# Patient Record
Sex: MALE | Race: BLACK OR AFRICAN AMERICAN | NOT HISPANIC OR LATINO | Employment: FULL TIME | URBAN - METROPOLITAN AREA
[De-identification: names, ages, dates, MRNs, and addresses within clinical notes are randomized per-mention and may not be internally consistent; named-entity substitution may affect disease eponyms.]

---

## 2023-02-24 LAB
ALANINE AMINOTRANSFERASE (SGPT) (U/L) IN SER/PLAS: 33 U/L (ref 10–52)
ALBUMIN (G/DL) IN SER/PLAS: 4.6 G/DL (ref 3.4–5)
ALKALINE PHOSPHATASE (U/L) IN SER/PLAS: 64 U/L (ref 33–120)
ANION GAP IN SER/PLAS: 12 MMOL/L (ref 10–20)
ASPARTATE AMINOTRANSFERASE (SGOT) (U/L) IN SER/PLAS: 25 U/L (ref 9–39)
BASOPHILS (10*3/UL) IN BLOOD BY AUTOMATED COUNT: 0.02 X10E9/L (ref 0–0.1)
BASOPHILS/100 LEUKOCYTES IN BLOOD BY AUTOMATED COUNT: 0.3 % (ref 0–2)
BILIRUBIN TOTAL (MG/DL) IN SER/PLAS: 0.8 MG/DL (ref 0–1.2)
CALCIUM (MG/DL) IN SER/PLAS: 9.5 MG/DL (ref 8.6–10.6)
CARBON DIOXIDE, TOTAL (MMOL/L) IN SER/PLAS: 26 MMOL/L (ref 21–32)
CHLORIDE (MMOL/L) IN SER/PLAS: 105 MMOL/L (ref 98–107)
CHOLESTEROL (MG/DL) IN SER/PLAS: 165 MG/DL (ref 0–199)
CHOLESTEROL IN HDL (MG/DL) IN SER/PLAS: 43.7 MG/DL
CHOLESTEROL/HDL RATIO: 3.8
CREATININE (MG/DL) IN SER/PLAS: 0.89 MG/DL (ref 0.5–1.3)
EOSINOPHILS (10*3/UL) IN BLOOD BY AUTOMATED COUNT: 0.24 X10E9/L (ref 0–0.7)
EOSINOPHILS/100 LEUKOCYTES IN BLOOD BY AUTOMATED COUNT: 3.5 % (ref 0–6)
ERYTHROCYTE DISTRIBUTION WIDTH (RATIO) BY AUTOMATED COUNT: 12.1 % (ref 11.5–14.5)
ERYTHROCYTE MEAN CORPUSCULAR HEMOGLOBIN CONCENTRATION (G/DL) BY AUTOMATED: 32.9 G/DL (ref 32–36)
ERYTHROCYTE MEAN CORPUSCULAR VOLUME (FL) BY AUTOMATED COUNT: 88 FL (ref 80–100)
ERYTHROCYTES (10*6/UL) IN BLOOD BY AUTOMATED COUNT: 4.94 X10E12/L (ref 4.5–5.9)
GFR MALE: >90 ML/MIN/1.73M2
GLUCOSE (MG/DL) IN SER/PLAS: 69 MG/DL (ref 74–99)
HEMATOCRIT (%) IN BLOOD BY AUTOMATED COUNT: 43.4 % (ref 41–52)
HEMOGLOBIN (G/DL) IN BLOOD: 14.3 G/DL (ref 13.5–17.5)
IMMATURE GRANULOCYTES/100 LEUKOCYTES IN BLOOD BY AUTOMATED COUNT: 0.3 % (ref 0–0.9)
LDL: 114 MG/DL (ref 0–99)
LEUKOCYTES (10*3/UL) IN BLOOD BY AUTOMATED COUNT: 6.9 X10E9/L (ref 4.4–11.3)
LYMPHOCYTES (10*3/UL) IN BLOOD BY AUTOMATED COUNT: 1.91 X10E9/L (ref 1.2–4.8)
LYMPHOCYTES/100 LEUKOCYTES IN BLOOD BY AUTOMATED COUNT: 27.8 % (ref 13–44)
MONOCYTES (10*3/UL) IN BLOOD BY AUTOMATED COUNT: 0.61 X10E9/L (ref 0.1–1)
MONOCYTES/100 LEUKOCYTES IN BLOOD BY AUTOMATED COUNT: 8.9 % (ref 2–10)
NEUTROPHILS (10*3/UL) IN BLOOD BY AUTOMATED COUNT: 4.07 X10E9/L (ref 1.2–7.7)
NEUTROPHILS/100 LEUKOCYTES IN BLOOD BY AUTOMATED COUNT: 59.2 % (ref 40–80)
NRBC (PER 100 WBCS) BY AUTOMATED COUNT: 0 /100 WBC (ref 0–0)
PLATELETS (10*3/UL) IN BLOOD AUTOMATED COUNT: 321 X10E9/L (ref 150–450)
POTASSIUM (MMOL/L) IN SER/PLAS: 4.3 MMOL/L (ref 3.5–5.3)
PROTEIN TOTAL: 7.3 G/DL (ref 6.4–8.2)
SODIUM (MMOL/L) IN SER/PLAS: 139 MMOL/L (ref 136–145)
THYROTROPIN (MIU/L) IN SER/PLAS BY DETECTION LIMIT <= 0.05 MIU/L: 0.72 MIU/L (ref 0.44–3.98)
TRIGLYCERIDE (MG/DL) IN SER/PLAS: 39 MG/DL (ref 0–149)
UREA NITROGEN (MG/DL) IN SER/PLAS: 11 MG/DL (ref 6–23)
VLDL: 8 MG/DL (ref 0–40)

## 2024-02-19 ENCOUNTER — OFFICE VISIT (OUTPATIENT)
Dept: PRIMARY CARE | Facility: CLINIC | Age: 44
End: 2024-02-19
Payer: COMMERCIAL

## 2024-02-19 VITALS
WEIGHT: 205 LBS | TEMPERATURE: 98.2 F | OXYGEN SATURATION: 96 % | SYSTOLIC BLOOD PRESSURE: 138 MMHG | BODY MASS INDEX: 28.7 KG/M2 | DIASTOLIC BLOOD PRESSURE: 88 MMHG | HEIGHT: 71 IN | HEART RATE: 87 BPM

## 2024-02-19 DIAGNOSIS — F51.01 PRIMARY INSOMNIA: Primary | ICD-10-CM

## 2024-02-19 PROCEDURE — 1036F TOBACCO NON-USER: CPT | Performed by: STUDENT IN AN ORGANIZED HEALTH CARE EDUCATION/TRAINING PROGRAM

## 2024-02-19 PROCEDURE — 99213 OFFICE O/P EST LOW 20 MIN: CPT | Performed by: STUDENT IN AN ORGANIZED HEALTH CARE EDUCATION/TRAINING PROGRAM

## 2024-02-19 RX ORDER — HYDROXYZINE HYDROCHLORIDE 25 MG/1
25 TABLET, FILM COATED ORAL NIGHTLY PRN
COMMUNITY
Start: 2023-02-20 | End: 2024-02-19 | Stop reason: SDUPTHER

## 2024-02-19 RX ORDER — HYDROXYZINE HYDROCHLORIDE 25 MG/1
25 TABLET, FILM COATED ORAL NIGHTLY PRN
Qty: 15 TABLET | Refills: 0 | Status: SHIPPED | OUTPATIENT
Start: 2024-02-19 | End: 2024-04-19

## 2024-02-19 ASSESSMENT — ENCOUNTER SYMPTOMS
HEADACHES: 0
ABDOMINAL PAIN: 0
SHORTNESS OF BREATH: 0
CHILLS: 0
LIGHT-HEADEDNESS: 0
ARTHRALGIAS: 0
MYALGIAS: 0
FEVER: 0
DIZZINESS: 0

## 2024-02-19 ASSESSMENT — PATIENT HEALTH QUESTIONNAIRE - PHQ9
2. FEELING DOWN, DEPRESSED OR HOPELESS: NOT AT ALL
1. LITTLE INTEREST OR PLEASURE IN DOING THINGS: NOT AT ALL
SUM OF ALL RESPONSES TO PHQ9 QUESTIONS 1 AND 2: 0

## 2024-02-19 NOTE — PROGRESS NOTES
"Subjective   Patient ID: Lance Cordero is a 43 y.o. male who presents for Follow-up.    HPI     No acute concerns or complaints today.  Patient presenting for regular follow-up.  He states that he follows up usually every other year for regular wellness examination.  He still usually comes in once a year to get refills of his prescription primarily for hydroxyzine to help treat his insomnia.  He states that he only needs to use it more sporadically but that it does really help with his symptoms when he does have them.  He has not used any other treatments and states that it seems to work very well.  Patient states he will plan on getting lab work at his next follow-up next year.    Review of Systems   Constitutional:  Negative for chills and fever.   HENT:  Negative for congestion.    Respiratory:  Negative for shortness of breath.    Cardiovascular:  Negative for chest pain.   Gastrointestinal:  Negative for abdominal pain.   Musculoskeletal:  Negative for arthralgias and myalgias.   Neurological:  Negative for dizziness, light-headedness and headaches.       Objective   /88   Pulse 87   Temp 36.8 °C (98.2 °F)   Ht 1.797 m (5' 10.75\")   Wt 93 kg (205 lb)   SpO2 96%   BMI 28.79 kg/m²     Physical Exam  Vitals and nursing note reviewed.   Constitutional:       General: He is not in acute distress.     Appearance: Normal appearance. He is normal weight. He is not ill-appearing or toxic-appearing.   HENT:      Head: Normocephalic and atraumatic.   Cardiovascular:      Rate and Rhythm: Normal rate and regular rhythm.      Heart sounds: Normal heart sounds.   Pulmonary:      Effort: Pulmonary effort is normal.      Breath sounds: Normal breath sounds.   Neurological:      Mental Status: He is alert.         Assessment/Plan   Problem List Items Addressed This Visit    None  Visit Diagnoses         Codes    Primary insomnia    -  Primary F51.01    Relevant Medications    hydrOXYzine HCL (Atarax) 25 mg tablet "          History and physical examination as above.  Patient presenting for follow-up and medication refill for his insomnia.  Refilled medication for hydroxyzine.  Did recommend regular yearly follow-up as well as regular blood pressure testing and lab work.  He states he will follow-up next year with his primary care physician.  Did discuss other over-the-counter and supplement based treatments that may also help with his insomnia.  He states that he probably will give them a try.  Patient will come in sooner for other acute concerns or complaints.

## 2024-07-08 ENCOUNTER — APPOINTMENT (OUTPATIENT)
Dept: PRIMARY CARE | Facility: CLINIC | Age: 44
End: 2024-07-08
Payer: COMMERCIAL

## 2024-07-08 VITALS
HEART RATE: 81 BPM | SYSTOLIC BLOOD PRESSURE: 128 MMHG | DIASTOLIC BLOOD PRESSURE: 92 MMHG | HEIGHT: 71 IN | WEIGHT: 211 LBS | BODY MASS INDEX: 29.54 KG/M2 | OXYGEN SATURATION: 97 % | TEMPERATURE: 97.1 F

## 2024-07-08 DIAGNOSIS — I10 PRIMARY HYPERTENSION: Primary | ICD-10-CM

## 2024-07-08 DIAGNOSIS — Z00.00 ROUTINE HEALTH MAINTENANCE: ICD-10-CM

## 2024-07-08 PROCEDURE — 3080F DIAST BP >= 90 MM HG: CPT | Performed by: FAMILY MEDICINE

## 2024-07-08 PROCEDURE — 99213 OFFICE O/P EST LOW 20 MIN: CPT | Performed by: FAMILY MEDICINE

## 2024-07-08 PROCEDURE — 3074F SYST BP LT 130 MM HG: CPT | Performed by: FAMILY MEDICINE

## 2024-07-08 RX ORDER — AMLODIPINE BESYLATE 5 MG/1
5 TABLET ORAL DAILY
Qty: 30 TABLET | Refills: 0 | Status: SHIPPED | OUTPATIENT
Start: 2024-07-08 | End: 2025-07-08

## 2024-07-08 ASSESSMENT — ENCOUNTER SYMPTOMS
CONSTITUTIONAL NEGATIVE: 1
GASTROINTESTINAL NEGATIVE: 1
DEPRESSION: 0
EYES NEGATIVE: 1
RESPIRATORY NEGATIVE: 1
CARDIOVASCULAR NEGATIVE: 1
LOSS OF SENSATION IN FEET: 0
OCCASIONAL FEELINGS OF UNSTEADINESS: 0

## 2024-07-08 ASSESSMENT — PATIENT HEALTH QUESTIONNAIRE - PHQ9
1. LITTLE INTEREST OR PLEASURE IN DOING THINGS: NOT AT ALL
10. IF YOU CHECKED OFF ANY PROBLEMS, HOW DIFFICULT HAVE THESE PROBLEMS MADE IT FOR YOU TO DO YOUR WORK, TAKE CARE OF THINGS AT HOME, OR GET ALONG WITH OTHER PEOPLE: NOT DIFFICULT AT ALL
SUM OF ALL RESPONSES TO PHQ9 QUESTIONS 1 AND 2: 0
2. FEELING DOWN, DEPRESSED OR HOPELESS: NOT AT ALL

## 2024-07-08 NOTE — PATIENT INSTRUCTIONS
Blood pressure remains elevated.    Adding medication to reduce the blood pressure.    Objective follow-up in 4 weeks please have lab studies performed please watch for troubles with headache or swelling of the legs or feet.  If this should happen please call and let me know.

## 2024-07-18 ENCOUNTER — LAB (OUTPATIENT)
Dept: LAB | Facility: LAB | Age: 44
End: 2024-07-18
Payer: COMMERCIAL

## 2024-07-18 DIAGNOSIS — I10 PRIMARY HYPERTENSION: ICD-10-CM

## 2024-07-18 DIAGNOSIS — Z00.00 ROUTINE HEALTH MAINTENANCE: ICD-10-CM

## 2024-07-18 LAB
BASOPHILS # BLD MANUAL: 0.15 X10*3/UL (ref 0–0.1)
BASOPHILS NFR BLD MANUAL: 2.6 %
EOSINOPHIL # BLD MANUAL: 0.45 X10*3/UL (ref 0–0.7)
EOSINOPHIL NFR BLD MANUAL: 7.7 %
ERYTHROCYTE [DISTWIDTH] IN BLOOD BY AUTOMATED COUNT: 12.4 % (ref 11.5–14.5)
HCT VFR BLD AUTO: 46.6 % (ref 41–52)
HGB BLD-MCNC: 14.9 G/DL (ref 13.5–17.5)
IMM GRANULOCYTES # BLD AUTO: 0.01 X10*3/UL (ref 0–0.7)
IMM GRANULOCYTES NFR BLD AUTO: 0.2 % (ref 0–0.9)
LYMPHOCYTES # BLD MANUAL: 1.93 X10*3/UL (ref 1.2–4.8)
LYMPHOCYTES NFR BLD MANUAL: 33.3 %
MCH RBC QN AUTO: 28.9 PG (ref 26–34)
MCHC RBC AUTO-ENTMCNC: 32 G/DL (ref 32–36)
MCV RBC AUTO: 90 FL (ref 80–100)
MONOCYTES # BLD MANUAL: 0.74 X10*3/UL (ref 0.1–1)
MONOCYTES NFR BLD MANUAL: 12.8 %
NEUTS SEG # BLD MANUAL: 2.38 X10*3/UL (ref 1.2–7)
NEUTS SEG NFR BLD MANUAL: 41 %
NRBC BLD-RTO: 0 /100 WBCS (ref 0–0)
PLASMA CELLS # BLD MANUAL: 0.05 X10*3/UL
PLASMA CELLS NFR BLD MANUAL: 0.9 %
PLATELET # BLD AUTO: 266 X10*3/UL (ref 150–450)
RBC # BLD AUTO: 5.16 X10*6/UL (ref 4.5–5.9)
RBC MORPH BLD: ABNORMAL
TOTAL CELLS COUNTED BLD: 117
VARIANT LYMPHS # BLD MANUAL: 0.1 X10*3/UL (ref 0–0.5)
VARIANT LYMPHS NFR BLD: 1.7 %
WBC # BLD AUTO: 5.8 X10*3/UL (ref 4.4–11.3)

## 2024-07-18 PROCEDURE — 80061 LIPID PANEL: CPT

## 2024-07-18 PROCEDURE — 85027 COMPLETE CBC AUTOMATED: CPT

## 2024-07-18 PROCEDURE — 85007 BL SMEAR W/DIFF WBC COUNT: CPT

## 2024-07-18 PROCEDURE — 84443 ASSAY THYROID STIM HORMONE: CPT

## 2024-07-18 PROCEDURE — 80053 COMPREHEN METABOLIC PANEL: CPT

## 2024-07-18 PROCEDURE — 36415 COLL VENOUS BLD VENIPUNCTURE: CPT

## 2024-07-19 LAB
ALBUMIN SERPL BCP-MCNC: 4.6 G/DL (ref 3.4–5)
ALP SERPL-CCNC: 75 U/L (ref 33–120)
ALT SERPL W P-5'-P-CCNC: 26 U/L (ref 10–52)
ANION GAP SERPL CALC-SCNC: 11 MMOL/L (ref 10–20)
AST SERPL W P-5'-P-CCNC: 23 U/L (ref 9–39)
BILIRUB SERPL-MCNC: 0.4 MG/DL (ref 0–1.2)
BUN SERPL-MCNC: 10 MG/DL (ref 6–23)
CALCIUM SERPL-MCNC: 9.9 MG/DL (ref 8.6–10.6)
CHLORIDE SERPL-SCNC: 104 MMOL/L (ref 98–107)
CHOLEST SERPL-MCNC: 203 MG/DL (ref 0–199)
CHOLESTEROL/HDL RATIO: 4.4
CO2 SERPL-SCNC: 28 MMOL/L (ref 21–32)
CREAT SERPL-MCNC: 0.97 MG/DL (ref 0.5–1.3)
EGFRCR SERPLBLD CKD-EPI 2021: >90 ML/MIN/1.73M*2
GLUCOSE SERPL-MCNC: 125 MG/DL (ref 74–99)
HDLC SERPL-MCNC: 46 MG/DL
LDLC SERPL CALC-MCNC: 129 MG/DL
NON HDL CHOLESTEROL: 157 MG/DL (ref 0–149)
POTASSIUM SERPL-SCNC: 4.4 MMOL/L (ref 3.5–5.3)
PROT SERPL-MCNC: 7.6 G/DL (ref 6.4–8.2)
SODIUM SERPL-SCNC: 139 MMOL/L (ref 136–145)
TRIGL SERPL-MCNC: 140 MG/DL (ref 0–149)
TSH SERPL-ACNC: 1.28 MIU/L (ref 0.44–3.98)
VLDL: 28 MG/DL (ref 0–40)

## 2024-07-30 DIAGNOSIS — I10 PRIMARY HYPERTENSION: ICD-10-CM

## 2024-07-30 RX ORDER — AMLODIPINE BESYLATE 5 MG/1
5 TABLET ORAL DAILY
Qty: 90 TABLET | Refills: 1 | Status: SHIPPED | OUTPATIENT
Start: 2024-07-30

## 2024-08-06 ENCOUNTER — APPOINTMENT (OUTPATIENT)
Dept: PRIMARY CARE | Facility: CLINIC | Age: 44
End: 2024-08-06
Payer: COMMERCIAL

## 2024-08-06 VITALS
DIASTOLIC BLOOD PRESSURE: 80 MMHG | WEIGHT: 212 LBS | SYSTOLIC BLOOD PRESSURE: 116 MMHG | BODY MASS INDEX: 29.68 KG/M2 | OXYGEN SATURATION: 97 % | HEART RATE: 84 BPM | TEMPERATURE: 97.5 F | HEIGHT: 71 IN

## 2024-08-06 DIAGNOSIS — I10 PRIMARY HYPERTENSION: Primary | ICD-10-CM

## 2024-08-06 DIAGNOSIS — R73.9 BLOOD GLUCOSE ELEVATED: ICD-10-CM

## 2024-08-06 PROCEDURE — 3079F DIAST BP 80-89 MM HG: CPT | Performed by: FAMILY MEDICINE

## 2024-08-06 PROCEDURE — 3074F SYST BP LT 130 MM HG: CPT | Performed by: FAMILY MEDICINE

## 2024-08-06 PROCEDURE — 3008F BODY MASS INDEX DOCD: CPT | Performed by: FAMILY MEDICINE

## 2024-08-06 PROCEDURE — 99213 OFFICE O/P EST LOW 20 MIN: CPT | Performed by: FAMILY MEDICINE

## 2024-08-06 RX ORDER — AMLODIPINE BESYLATE 2.5 MG/1
2.5 TABLET ORAL DAILY
Qty: 60 TABLET | Refills: 0 | Status: SHIPPED | OUTPATIENT
Start: 2024-08-06 | End: 2024-10-05

## 2024-08-06 ASSESSMENT — PATIENT HEALTH QUESTIONNAIRE - PHQ9
1. LITTLE INTEREST OR PLEASURE IN DOING THINGS: NOT AT ALL
SUM OF ALL RESPONSES TO PHQ9 QUESTIONS 1 AND 2: 0
2. FEELING DOWN, DEPRESSED OR HOPELESS: NOT AT ALL
10. IF YOU CHECKED OFF ANY PROBLEMS, HOW DIFFICULT HAVE THESE PROBLEMS MADE IT FOR YOU TO DO YOUR WORK, TAKE CARE OF THINGS AT HOME, OR GET ALONG WITH OTHER PEOPLE: NOT DIFFICULT AT ALL

## 2024-08-06 ASSESSMENT — ENCOUNTER SYMPTOMS
CARDIOVASCULAR NEGATIVE: 1
LOSS OF SENSATION IN FEET: 0
CONSTITUTIONAL NEGATIVE: 1
DEPRESSION: 0
RESPIRATORY NEGATIVE: 1
EYES NEGATIVE: 1
OCCASIONAL FEELINGS OF UNSTEADINESS: 0

## 2024-08-06 NOTE — PATIENT INSTRUCTIONS
Glucose level was slightly elevated.  We are going to recheck this in the next 3 months.  Doing a CMP and hemoglobin A1c.    Blood pressure is significantly improved for him to back off the amlodipine to 2.5 mg daily would like to follow-up in 3 months.    Please follow dietary guidelines as noted.

## 2024-08-06 NOTE — PROGRESS NOTES
"Subjective   Patient ID: Lance Cordero is a 44 y.o. male who presents for Follow-up (BP).    Overall doing well has had a bit of nausea with the medicine.  No troubles with chest pain or shortness of breath no dizziness no lightheadedness.  No troubles with abdominal pain or discomfort.  No swelling of the legs or feet.  No fever no chills no night sweats.    Reviewed labs with you today the cholesterol level is slightly elevated glucose level also slightly elevated.  Blood pressure very well-controlled         Review of Systems   Constitutional: Negative.    HENT: Negative.     Eyes: Negative.    Respiratory: Negative.     Cardiovascular: Negative.        Objective   /80   Pulse 84   Temp 36.4 °C (97.5 °F)   Ht 1.797 m (5' 10.75\")   Wt 96.2 kg (212 lb)   SpO2 97%   BMI 29.78 kg/m²   BSA Body surface area is 2.19 meters squared.      Physical Exam  Constitutional:       Appearance: Normal appearance.   HENT:      Head: Normocephalic.   Cardiovascular:      Rate and Rhythm: Normal rate.      Pulses: Normal pulses.   Pulmonary:      Effort: Pulmonary effort is normal.      Breath sounds: Normal breath sounds.   Musculoskeletal:      Cervical back: Normal range of motion.   Neurological:      Mental Status: He is alert.       Lab on 07/18/2024   Component Date Value Ref Range Status    WBC 07/18/2024 5.8  4.4 - 11.3 x10*3/uL Final    nRBC 07/18/2024 0.0  0.0 - 0.0 /100 WBCs Final    RBC 07/18/2024 5.16  4.50 - 5.90 x10*6/uL Final    Hemoglobin 07/18/2024 14.9  13.5 - 17.5 g/dL Final    Hematocrit 07/18/2024 46.6  41.0 - 52.0 % Final    MCV 07/18/2024 90  80 - 100 fL Final    MCH 07/18/2024 28.9  26.0 - 34.0 pg Final    MCHC 07/18/2024 32.0  32.0 - 36.0 g/dL Final    RDW 07/18/2024 12.4  11.5 - 14.5 % Final    Platelets 07/18/2024 266  150 - 450 x10*3/uL Final    Immature Granulocytes %, Automated 07/18/2024 0.2  0.0 - 0.9 % Final    Immature Granulocyte Count (IG) includes promyelocytes, myelocytes and " metamyelocytes but does not include bands. Percent differential counts (%) should be interpreted in the context of the absolute cell counts (cells/UL).    Immature Granulocytes Absolute, Au* 07/18/2024 0.01  0.00 - 0.70 x10*3/uL Final    Glucose 07/18/2024 125 (H)  74 - 99 mg/dL Final    Sodium 07/18/2024 139  136 - 145 mmol/L Final    Potassium 07/18/2024 4.4  3.5 - 5.3 mmol/L Final    Chloride 07/18/2024 104  98 - 107 mmol/L Final    Bicarbonate 07/18/2024 28  21 - 32 mmol/L Final    Anion Gap 07/18/2024 11  10 - 20 mmol/L Final    Urea Nitrogen 07/18/2024 10  6 - 23 mg/dL Final    Creatinine 07/18/2024 0.97  0.50 - 1.30 mg/dL Final    eGFR 07/18/2024 >90  >60 mL/min/1.73m*2 Final    Calculations of estimated GFR are performed using the 2021 CKD-EPI Study Refit equation without the race variable for the IDMS-Traceable creatinine methods.  https://jasn.asnjournals.org/content/early/2021/09/22/ASN.4251622670    Calcium 07/18/2024 9.9  8.6 - 10.6 mg/dL Final    Albumin 07/18/2024 4.6  3.4 - 5.0 g/dL Final    Alkaline Phosphatase 07/18/2024 75  33 - 120 U/L Final    Total Protein 07/18/2024 7.6  6.4 - 8.2 g/dL Final    AST 07/18/2024 23  9 - 39 U/L Final    Bilirubin, Total 07/18/2024 0.4  0.0 - 1.2 mg/dL Final    ALT 07/18/2024 26  10 - 52 U/L Final    Patients treated with Sulfasalazine may generate falsely decreased results for ALT.    Cholesterol 07/18/2024 203 (H)  0 - 199 mg/dL Final          Age      Desirable   Borderline High   High     0-19 Y     0 - 169       170 - 199     >/= 200    20-24 Y     0 - 189       190 - 224     >/= 225         >24 Y     0 - 199       200 - 239     >/= 240   **All ranges are based on fasting samples. Specific   therapeutic targets will vary based on patient-specific   cardiac risk.    Pediatric guidelines reference:Pediatrics 2011, 128(S5).Adult guidelines reference: NCEP ATPIII Guidelines,CHELSIE 2001, 258:2486-97    Venipuncture immediately after or during the administration of  Metamizole may lead to falsely low results. Testing should be performed immediately prior to Metamizole dosing.    HDL-Cholesterol 07/18/2024 46.0  mg/dL Final      Age       Very Low   Low     Normal    High    0-19 Y    < 35      < 40     40-45     ----  20-24 Y    ----     < 40      >45      ----        >24 Y      ----     < 40     40-60      >60      Cholesterol/HDL Ratio 07/18/2024 4.4   Final      Ref Values  Desirable  < 3.4  High Risk  > 5.0    LDL Calculated 07/18/2024 129 (H)  <=99 mg/dL Final                                Near   Borderline      AGE      Desirable  Optimal    High     High     Very High     0-19 Y     0 - 109     ---    110-129   >/= 130     ----    20-24 Y     0 - 119     ---    120-159   >/= 160     ----      >24 Y     0 -  99   100-129  130-159   160-189     >/=190      VLDL 07/18/2024 28  0 - 40 mg/dL Final    Triglycerides 07/18/2024 140  0 - 149 mg/dL Final       Age         Desirable   Borderline High   High     Very High   0 D-90 D    19 - 174         ----         ----        ----  91 D- 9 Y     0 -  74        75 -  99     >/= 100      ----    10-19 Y     0 -  89        90 - 129     >/= 130      ----    20-24 Y     0 - 114       115 - 149     >/= 150      ----         >24 Y     0 - 149       150 - 199    200- 499    >/= 500    Venipuncture immediately after or during the administration of Metamizole may lead to falsely low results. Testing should be performed immediately prior to Metamizole dosing.    Non HDL Cholesterol 07/18/2024 157 (H)  0 - 149 mg/dL Final          Age       Desirable   Borderline High   High     Very High     0-19 Y     0 - 119       120 - 144     >/= 145    >/= 160    20-24 Y     0 - 149       150 - 189     >/= 190      ----         >24 Y    30 mg/dL above LDL Cholesterol goal      Thyroid Stimulating Hormone 07/18/2024 1.28  0.44 - 3.98 mIU/L Final    Neutrophils %, Manual 07/18/2024 41.0  40.0 - 80.0 % Final    Percent differential counts (%) should be  interpreted in the context of the absolute cell counts (cells/uL).    Lymphocytes %, Manual 07/18/2024 33.3  13.0 - 44.0 % Final    Monocytes %, Manual 07/18/2024 12.8  2.0 - 10.0 % Final    Eosinophils %, Manual 07/18/2024 7.7  0.0 - 6.0 % Final    Basophils %, Manual 07/18/2024 2.6  0.0 - 2.0 % Final    Atypical Lymphocytes %, Manual 07/18/2024 1.7  0.0 - 2.0 % Final    Plasma Cells %, Manual 07/18/2024 0.9  0.00 - 0.00 % Final    Seg Neutrophils Absolute, Manual 07/18/2024 2.38  1.20 - 7.00 x10*3/uL Final    Lymphocytes Absolute, Manual 07/18/2024 1.93  1.20 - 4.80 x10*3/uL Final    Monocytes Absolute, Manual 07/18/2024 0.74  0.10 - 1.00 x10*3/uL Final    Eosinophils Absolute, Manual 07/18/2024 0.45  0.00 - 0.70 x10*3/uL Final    Basophils Absolute, Manual 07/18/2024 0.15 (H)  0.00 - 0.10 x10*3/uL Final    Atypical Lymphs Absolute, Manual 07/18/2024 0.10  0.00 - 0.50 x10*3/uL Final    Plasma Cells Absolute, Manual 07/18/2024 0.05  0.00 - 0.00 x10*3/uL Final    Total Cells Counted 07/18/2024 117   Final    RBC Morphology 07/18/2024 No significant RBC morphology present   Final     Current Outpatient Medications on File Prior to Visit   Medication Sig Dispense Refill    amLODIPine (Norvasc) 5 mg tablet TAKE 1 TABLET BY MOUTH EVERY DAY 90 tablet 1    hydrOXYzine HCL (Atarax) 25 mg tablet Take 1 tablet (25 mg) by mouth as needed at bedtime (insomnia). 15 tablet 0     No current facility-administered medications on file prior to visit.     No images are attached to the encounter.            Assessment/Plan   Problem List Items Addressed This Visit             ICD-10-CM    Primary hypertension - Primary I10    Blood glucose elevated R73.9

## 2024-08-31 DIAGNOSIS — I10 PRIMARY HYPERTENSION: ICD-10-CM

## 2024-09-03 RX ORDER — AMLODIPINE BESYLATE 2.5 MG/1
2.5 TABLET ORAL DAILY
Qty: 90 TABLET | Refills: 1 | Status: SHIPPED | OUTPATIENT
Start: 2024-09-03

## 2024-11-05 ENCOUNTER — LAB (OUTPATIENT)
Dept: LAB | Facility: LAB | Age: 44
End: 2024-11-05
Payer: COMMERCIAL

## 2024-11-05 DIAGNOSIS — I10 PRIMARY HYPERTENSION: ICD-10-CM

## 2024-11-05 DIAGNOSIS — R73.9 BLOOD GLUCOSE ELEVATED: ICD-10-CM

## 2024-11-05 LAB
ALBUMIN SERPL BCP-MCNC: 4.7 G/DL (ref 3.4–5)
ALP SERPL-CCNC: 79 U/L (ref 33–120)
ALT SERPL W P-5'-P-CCNC: 36 U/L (ref 10–52)
ANION GAP SERPL CALC-SCNC: 12 MMOL/L (ref 10–20)
AST SERPL W P-5'-P-CCNC: 27 U/L (ref 9–39)
BILIRUB SERPL-MCNC: 0.5 MG/DL (ref 0–1.2)
BUN SERPL-MCNC: 12 MG/DL (ref 6–23)
CALCIUM SERPL-MCNC: 9.8 MG/DL (ref 8.6–10.6)
CHLORIDE SERPL-SCNC: 102 MMOL/L (ref 98–107)
CHOLEST SERPL-MCNC: 217 MG/DL (ref 0–199)
CHOLESTEROL/HDL RATIO: 4.3
CO2 SERPL-SCNC: 29 MMOL/L (ref 21–32)
CREAT SERPL-MCNC: 1.03 MG/DL (ref 0.5–1.3)
EGFRCR SERPLBLD CKD-EPI 2021: >90 ML/MIN/1.73M*2
EST. AVERAGE GLUCOSE BLD GHB EST-MCNC: 117 MG/DL
GLUCOSE SERPL-MCNC: 115 MG/DL (ref 74–99)
HBA1C MFR BLD: 5.7 %
HDLC SERPL-MCNC: 50.6 MG/DL
LDLC SERPL CALC-MCNC: 143 MG/DL
NON HDL CHOLESTEROL: 166 MG/DL (ref 0–149)
POTASSIUM SERPL-SCNC: 4.6 MMOL/L (ref 3.5–5.3)
PROT SERPL-MCNC: 7.6 G/DL (ref 6.4–8.2)
SODIUM SERPL-SCNC: 138 MMOL/L (ref 136–145)
TRIGL SERPL-MCNC: 119 MG/DL (ref 0–149)
VLDL: 24 MG/DL (ref 0–40)

## 2024-11-05 PROCEDURE — 80053 COMPREHEN METABOLIC PANEL: CPT

## 2024-11-05 PROCEDURE — 36415 COLL VENOUS BLD VENIPUNCTURE: CPT

## 2024-11-05 PROCEDURE — 83036 HEMOGLOBIN GLYCOSYLATED A1C: CPT

## 2024-11-05 PROCEDURE — 80061 LIPID PANEL: CPT

## 2024-11-06 ASSESSMENT — ENCOUNTER SYMPTOMS
HYPERTENSION: 1
SWEATS: 0
SHORTNESS OF BREATH: 0
PND: 0
ORTHOPNEA: 0
NECK PAIN: 0
PALPITATIONS: 0
HEADACHES: 0
BLURRED VISION: 0

## 2024-11-07 ENCOUNTER — APPOINTMENT (OUTPATIENT)
Dept: PRIMARY CARE | Facility: CLINIC | Age: 44
End: 2024-11-07
Payer: COMMERCIAL

## 2024-11-07 VITALS
BODY MASS INDEX: 29.68 KG/M2 | WEIGHT: 212 LBS | HEIGHT: 71 IN | SYSTOLIC BLOOD PRESSURE: 132 MMHG | TEMPERATURE: 97.2 F | OXYGEN SATURATION: 97 % | HEART RATE: 99 BPM | DIASTOLIC BLOOD PRESSURE: 80 MMHG

## 2024-11-07 DIAGNOSIS — R73.9 BLOOD GLUCOSE ELEVATED: Primary | ICD-10-CM

## 2024-11-07 DIAGNOSIS — E78.5 HYPERLIPIDEMIA, UNSPECIFIED HYPERLIPIDEMIA TYPE: ICD-10-CM

## 2024-11-07 DIAGNOSIS — I10 PRIMARY HYPERTENSION: ICD-10-CM

## 2024-11-07 PROCEDURE — 3079F DIAST BP 80-89 MM HG: CPT | Performed by: FAMILY MEDICINE

## 2024-11-07 PROCEDURE — 99214 OFFICE O/P EST MOD 30 MIN: CPT | Performed by: FAMILY MEDICINE

## 2024-11-07 PROCEDURE — 3008F BODY MASS INDEX DOCD: CPT | Performed by: FAMILY MEDICINE

## 2024-11-07 PROCEDURE — 3075F SYST BP GE 130 - 139MM HG: CPT | Performed by: FAMILY MEDICINE

## 2024-11-07 RX ORDER — AMLODIPINE BESYLATE 2.5 MG/1
2.5 TABLET ORAL DAILY
Qty: 90 TABLET | Refills: 1 | Status: SHIPPED | OUTPATIENT
Start: 2024-11-07

## 2024-11-07 RX ORDER — PIMECROLIMUS 10 MG/G
CREAM TOPICAL
COMMUNITY
Start: 2024-08-29

## 2024-11-07 RX ORDER — TACROLIMUS 1 MG/G
OINTMENT TOPICAL
COMMUNITY
Start: 2024-08-29

## 2024-11-07 ASSESSMENT — ENCOUNTER SYMPTOMS
GASTROINTESTINAL NEGATIVE: 1
PHOTOPHOBIA: 0
ORTHOPNEA: 0
DEPRESSION: 0
RESPIRATORY NEGATIVE: 1
SHORTNESS OF BREATH: 0
OCCASIONAL FEELINGS OF UNSTEADINESS: 0
HEADACHES: 0
NECK PAIN: 0
PND: 0
ACTIVITY CHANGE: 0
HYPERTENSION: 1
EYE PAIN: 0
LOSS OF SENSATION IN FEET: 0
PALPITATIONS: 0
DIAPHORESIS: 0
SWEATS: 0
BLURRED VISION: 0

## 2024-11-07 ASSESSMENT — PATIENT HEALTH QUESTIONNAIRE - PHQ9
2. FEELING DOWN, DEPRESSED OR HOPELESS: NOT AT ALL
SUM OF ALL RESPONSES TO PHQ9 QUESTIONS 1 AND 2: 0
1. LITTLE INTEREST OR PLEASURE IN DOING THINGS: NOT AT ALL
10. IF YOU CHECKED OFF ANY PROBLEMS, HOW DIFFICULT HAVE THESE PROBLEMS MADE IT FOR YOU TO DO YOUR WORK, TAKE CARE OF THINGS AT HOME, OR GET ALONG WITH OTHER PEOPLE: NOT DIFFICULT AT ALL

## 2024-11-07 NOTE — PATIENT INSTRUCTIONS
Labs were reviewed including hemoglobin A1c and CMP.    Cholesterol levels also reviewed.  Cholesterol still not at goal need for you to follow the dietary guidelines closely try to exercise 40 minutes 4 times weekly.    The hemoglobin A1c does not show evidence of diabetes but does show evidence of elevation of glucose would like for you to really reduce any extra sugar intake.    Blood pressure is at goal.    Clinic follow-up in the next 6 months for physical exam.  Will recheck labs at that

## 2024-11-07 NOTE — ASSESSMENT & PLAN NOTE
Glucose and cholesterol levels are elevated.    Hemoglobin A1c is stable but would like you.  Follow dietary guidelines more closely try to exercise 40 minutes 4 times weekly

## 2024-11-07 NOTE — PROGRESS NOTES
"Subjective   Patient ID: Lance Cordero is a 44 y.o. male who presents for Follow-up (BP).    Patient presents for follow-up.    Patient had no chest pain or shortness of breath.  Tolerating medication well.  No significant swelling of the legs or feet    Hypertension  This is a chronic problem. The current episode started in the past 7 days. The problem has been gradually worsening since onset. The problem is resistant. Pertinent negatives include no anxiety, blurred vision, chest pain, headaches, malaise/fatigue, neck pain, orthopnea, palpitations, peripheral edema, PND, shortness of breath or sweats. There are no associated agents to hypertension. Risk factors for coronary artery disease include dyslipidemia and family history. Compliance problems include diet and exercise.     patient presents for follow-up on blood pressure    Review of Systems   Constitutional:  Negative for activity change, diaphoresis and malaise/fatigue.   HENT:  Negative for congestion and dental problem.    Eyes:  Negative for blurred vision, photophobia and pain.   Respiratory: Negative.  Negative for shortness of breath.    Cardiovascular:  Negative for chest pain, palpitations, orthopnea and PND.   Gastrointestinal: Negative.    Musculoskeletal:  Negative for neck pain.   Neurological:  Negative for headaches.       Objective   /80   Pulse 99   Temp 36.2 °C (97.2 °F)   Ht 1.797 m (5' 10.75\")   Wt 96.2 kg (212 lb)   SpO2 97%   BMI 29.78 kg/m²   BSA Body surface area is 2.19 meters squared.      Physical Exam  Constitutional:       Appearance: Normal appearance.   HENT:      Head: Normocephalic and atraumatic.      Right Ear: Tympanic membrane normal.      Left Ear: Tympanic membrane normal.   Cardiovascular:      Rate and Rhythm: Normal rate and regular rhythm.      Pulses: Normal pulses.   Pulmonary:      Effort: No respiratory distress.   Abdominal:      Palpations: There is no mass.      Tenderness: There is no rebound. "   Musculoskeletal:      Cervical back: Neck supple. No rigidity.   Lymphadenopathy:      Cervical: No cervical adenopathy.   Neurological:      Mental Status: He is alert.       Lab on 11/05/2024   Component Date Value Ref Range Status    Glucose 11/05/2024 115 (H)  74 - 99 mg/dL Final    Sodium 11/05/2024 138  136 - 145 mmol/L Final    Potassium 11/05/2024 4.6  3.5 - 5.3 mmol/L Final    Chloride 11/05/2024 102  98 - 107 mmol/L Final    Bicarbonate 11/05/2024 29  21 - 32 mmol/L Final    Anion Gap 11/05/2024 12  10 - 20 mmol/L Final    Urea Nitrogen 11/05/2024 12  6 - 23 mg/dL Final    Creatinine 11/05/2024 1.03  0.50 - 1.30 mg/dL Final    eGFR 11/05/2024 >90  >60 mL/min/1.73m*2 Final    Calculations of estimated GFR are performed using the 2021 CKD-EPI Study Refit equation without the race variable for the IDMS-Traceable creatinine methods.  https://jasn.asnjournals.org/content/early/2021/09/22/ASN.4189826673    Calcium 11/05/2024 9.8  8.6 - 10.6 mg/dL Final    Albumin 11/05/2024 4.7  3.4 - 5.0 g/dL Final    Alkaline Phosphatase 11/05/2024 79  33 - 120 U/L Final    Total Protein 11/05/2024 7.6  6.4 - 8.2 g/dL Final    AST 11/05/2024 27  9 - 39 U/L Final    Bilirubin, Total 11/05/2024 0.5  0.0 - 1.2 mg/dL Final    ALT 11/05/2024 36  10 - 52 U/L Final    Patients treated with Sulfasalazine may generate falsely decreased results for ALT.    Cholesterol 11/05/2024 217 (H)  0 - 199 mg/dL Final          Age      Desirable   Borderline High   High     0-19 Y     0 - 169       170 - 199     >/= 200    20-24 Y     0 - 189       190 - 224     >/= 225         >24 Y     0 - 199       200 - 239     >/= 240   **All ranges are based on fasting samples. Specific   therapeutic targets will vary based on patient-specific   cardiac risk.    Pediatric guidelines reference:Pediatrics 2011, 128(S5).Adult guidelines reference: NCEP ATPIII Guidelines,CHELSIE 2001, 258:2486-97    Venipuncture immediately after or during the administration of  Metamizole may lead to falsely low results. Testing should be performed immediately prior to Metamizole dosing.    HDL-Cholesterol 11/05/2024 50.6  mg/dL Final      Age       Very Low   Low     Normal    High    0-19 Y    < 35      < 40     40-45     ----  20-24 Y    ----     < 40      >45      ----        >24 Y      ----     < 40     40-60      >60      Cholesterol/HDL Ratio 11/05/2024 4.3   Final      Ref Values  Desirable  < 3.4  High Risk  > 5.0    LDL Calculated 11/05/2024 143 (H)  <=99 mg/dL Final                                Near   Borderline      AGE      Desirable  Optimal    High     High     Very High     0-19 Y     0 - 109     ---    110-129   >/= 130     ----    20-24 Y     0 - 119     ---    120-159   >/= 160     ----      >24 Y     0 -  99   100-129  130-159   160-189     >/=190      VLDL 11/05/2024 24  0 - 40 mg/dL Final    Triglycerides 11/05/2024 119  0 - 149 mg/dL Final    Age              Desirable        Borderline         High        Very High  SEX:B           mg/dL             mg/dL               mg/dL      mg/dL  <=14D                       ----               ----        ----  15D-365D                    ----               ----        ----  1Y-9Y           0-74               75-99             >=100       ----  10Y-19Y        0-89                            >=130       ----  20Y-24Y        0-114             115-149             >=150      ----  >= 25Y         0-149             150-199             200-499    >=500      Venipuncture immediately after or during the administration of Metamizole may lead to falsely low results. Testing should be performed immediately prior to Metamizole dosing.    Non HDL Cholesterol 11/05/2024 166 (H)  0 - 149 mg/dL Final          Age       Desirable   Borderline High   High     Very High     0-19 Y     0 - 119       120 - 144     >/= 145    >/= 160    20-24 Y     0 - 149       150 - 189     >/= 190      ----         >24 Y    30 mg/dL above LDL  Cholesterol goal      Hemoglobin A1C 11/05/2024 5.7 (H)  See comment % Final    Estimated Average Glucose 11/05/2024 117  Not Established mg/dL Final   Lab on 07/18/2024   Component Date Value Ref Range Status    WBC 07/18/2024 5.8  4.4 - 11.3 x10*3/uL Final    nRBC 07/18/2024 0.0  0.0 - 0.0 /100 WBCs Final    RBC 07/18/2024 5.16  4.50 - 5.90 x10*6/uL Final    Hemoglobin 07/18/2024 14.9  13.5 - 17.5 g/dL Final    Hematocrit 07/18/2024 46.6  41.0 - 52.0 % Final    MCV 07/18/2024 90  80 - 100 fL Final    MCH 07/18/2024 28.9  26.0 - 34.0 pg Final    MCHC 07/18/2024 32.0  32.0 - 36.0 g/dL Final    RDW 07/18/2024 12.4  11.5 - 14.5 % Final    Platelets 07/18/2024 266  150 - 450 x10*3/uL Final    Immature Granulocytes %, Automated 07/18/2024 0.2  0.0 - 0.9 % Final    Immature Granulocyte Count (IG) includes promyelocytes, myelocytes and metamyelocytes but does not include bands. Percent differential counts (%) should be interpreted in the context of the absolute cell counts (cells/UL).    Immature Granulocytes Absolute, Au* 07/18/2024 0.01  0.00 - 0.70 x10*3/uL Final    Glucose 07/18/2024 125 (H)  74 - 99 mg/dL Final    Sodium 07/18/2024 139  136 - 145 mmol/L Final    Potassium 07/18/2024 4.4  3.5 - 5.3 mmol/L Final    Chloride 07/18/2024 104  98 - 107 mmol/L Final    Bicarbonate 07/18/2024 28  21 - 32 mmol/L Final    Anion Gap 07/18/2024 11  10 - 20 mmol/L Final    Urea Nitrogen 07/18/2024 10  6 - 23 mg/dL Final    Creatinine 07/18/2024 0.97  0.50 - 1.30 mg/dL Final    eGFR 07/18/2024 >90  >60 mL/min/1.73m*2 Final    Calculations of estimated GFR are performed using the 2021 CKD-EPI Study Refit equation without the race variable for the IDMS-Traceable creatinine methods.  https://jasn.asnjournals.org/content/early/2021/09/22/ASN.3613117707    Calcium 07/18/2024 9.9  8.6 - 10.6 mg/dL Final    Albumin 07/18/2024 4.6  3.4 - 5.0 g/dL Final    Alkaline Phosphatase 07/18/2024 75  33 - 120 U/L Final    Total Protein 07/18/2024  7.6  6.4 - 8.2 g/dL Final    AST 07/18/2024 23  9 - 39 U/L Final    Bilirubin, Total 07/18/2024 0.4  0.0 - 1.2 mg/dL Final    ALT 07/18/2024 26  10 - 52 U/L Final    Patients treated with Sulfasalazine may generate falsely decreased results for ALT.    Cholesterol 07/18/2024 203 (H)  0 - 199 mg/dL Final          Age      Desirable   Borderline High   High     0-19 Y     0 - 169       170 - 199     >/= 200    20-24 Y     0 - 189       190 - 224     >/= 225         >24 Y     0 - 199       200 - 239     >/= 240   **All ranges are based on fasting samples. Specific   therapeutic targets will vary based on patient-specific   cardiac risk.    Pediatric guidelines reference:Pediatrics 2011, 128(S5).Adult guidelines reference: NCEP ATPIII Guidelines,CHELSIE 2001, 258:0636-77    Venipuncture immediately after or during the administration of Metamizole may lead to falsely low results. Testing should be performed immediately prior to Metamizole dosing.    HDL-Cholesterol 07/18/2024 46.0  mg/dL Final      Age       Very Low   Low     Normal    High    0-19 Y    < 35      < 40     40-45     ----  20-24 Y    ----     < 40      >45      ----        >24 Y      ----     < 40     40-60      >60      Cholesterol/HDL Ratio 07/18/2024 4.4   Final      Ref Values  Desirable  < 3.4  High Risk  > 5.0    LDL Calculated 07/18/2024 129 (H)  <=99 mg/dL Final                                Near   Borderline      AGE      Desirable  Optimal    High     High     Very High     0-19 Y     0 - 109     ---    110-129   >/= 130     ----    20-24 Y     0 - 119     ---    120-159   >/= 160     ----      >24 Y     0 -  99   100-129  130-159   160-189     >/=190      VLDL 07/18/2024 28  0 - 40 mg/dL Final    Triglycerides 07/18/2024 140  0 - 149 mg/dL Final       Age         Desirable   Borderline High   High     Very High   0 D-90 D    19 - 174         ----         ----        ----  91 D- 9 Y     0 -  74        75 -  99     >/= 100      ----    10-19 Y      0 -  89        90 - 129     >/= 130      ----    20-24 Y     0 - 114       115 - 149     >/= 150      ----         >24 Y     0 - 149       150 - 199    200- 499    >/= 500    Venipuncture immediately after or during the administration of Metamizole may lead to falsely low results. Testing should be performed immediately prior to Metamizole dosing.    Non HDL Cholesterol 07/18/2024 157 (H)  0 - 149 mg/dL Final          Age       Desirable   Borderline High   High     Very High     0-19 Y     0 - 119       120 - 144     >/= 145    >/= 160    20-24 Y     0 - 149       150 - 189     >/= 190      ----         >24 Y    30 mg/dL above LDL Cholesterol goal      Thyroid Stimulating Hormone 07/18/2024 1.28  0.44 - 3.98 mIU/L Final    Neutrophils %, Manual 07/18/2024 41.0  40.0 - 80.0 % Final    Percent differential counts (%) should be interpreted in the context of the absolute cell counts (cells/uL).    Lymphocytes %, Manual 07/18/2024 33.3  13.0 - 44.0 % Final    Monocytes %, Manual 07/18/2024 12.8  2.0 - 10.0 % Final    Eosinophils %, Manual 07/18/2024 7.7  0.0 - 6.0 % Final    Basophils %, Manual 07/18/2024 2.6  0.0 - 2.0 % Final    Atypical Lymphocytes %, Manual 07/18/2024 1.7  0.0 - 2.0 % Final    Plasma Cells %, Manual 07/18/2024 0.9  0.00 - 0.00 % Final    Seg Neutrophils Absolute, Manual 07/18/2024 2.38  1.20 - 7.00 x10*3/uL Final    Lymphocytes Absolute, Manual 07/18/2024 1.93  1.20 - 4.80 x10*3/uL Final    Monocytes Absolute, Manual 07/18/2024 0.74  0.10 - 1.00 x10*3/uL Final    Eosinophils Absolute, Manual 07/18/2024 0.45  0.00 - 0.70 x10*3/uL Final    Basophils Absolute, Manual 07/18/2024 0.15 (H)  0.00 - 0.10 x10*3/uL Final    Atypical Lymphs Absolute, Manual 07/18/2024 0.10  0.00 - 0.50 x10*3/uL Final    Plasma Cells Absolute, Manual 07/18/2024 0.05  0.00 - 0.00 x10*3/uL Final    Total Cells Counted 07/18/2024 117   Final    RBC Morphology 07/18/2024 No significant RBC morphology present   Final     Current  Outpatient Medications on File Prior to Visit   Medication Sig Dispense Refill    amLODIPine (Norvasc) 2.5 mg tablet TAKE 1 TABLET BY MOUTH ONCE DAILY. 90 tablet 1    pimecrolimus (Elidel) 1 % cream 1 APPLICATION TO AFFECTED AREA EXTERNALLY TWICE A DAY TO FACIAL RASH, MAY USE DAILY IF NEEDED      tacrolimus (Protopic) 0.1 % ointment 1 APPLICATION EXTERNALLY ONCE A DAY TO ACTIVE AREAS OF RASH 30 DAYS      hydrOXYzine HCL (Atarax) 25 mg tablet Take 1 tablet (25 mg) by mouth as needed at bedtime (insomnia). 15 tablet 0     No current facility-administered medications on file prior to visit.     No images are attached to the encounter.            Assessment/Plan   Problem List Items Addressed This Visit             ICD-10-CM    Primary hypertension I10     Blood pressure better controlled.  Continue present regimen         Relevant Medications    amLODIPine (Norvasc) 2.5 mg tablet    Blood glucose elevated - Primary R73.9     Glucose and cholesterol levels are elevated.    Hemoglobin A1c is stable but would like you.  Follow dietary guidelines more closely try to exercise 40 minutes 4 times weekly          Other Visit Diagnoses         Codes    Hyperlipidemia, unspecified hyperlipidemia type     E78.5

## 2024-12-23 DIAGNOSIS — F51.01 PRIMARY INSOMNIA: ICD-10-CM

## 2024-12-24 RX ORDER — HYDROXYZINE HYDROCHLORIDE 25 MG/1
25 TABLET, FILM COATED ORAL NIGHTLY PRN
Qty: 15 TABLET | Refills: 0 | Status: SHIPPED | OUTPATIENT
Start: 2024-12-24 | End: 2025-02-22

## 2025-01-09 DIAGNOSIS — F51.01 PRIMARY INSOMNIA: ICD-10-CM

## 2025-01-09 RX ORDER — HYDROXYZINE HYDROCHLORIDE 25 MG/1
25 TABLET, FILM COATED ORAL NIGHTLY PRN
Qty: 60 TABLET | Refills: 0 | Status: SHIPPED | OUTPATIENT
Start: 2025-01-09 | End: 2025-03-10

## 2025-02-11 DIAGNOSIS — F51.01 PRIMARY INSOMNIA: ICD-10-CM

## 2025-02-11 RX ORDER — HYDROXYZINE HYDROCHLORIDE 25 MG/1
25 TABLET, FILM COATED ORAL NIGHTLY PRN
Qty: 90 TABLET | Refills: 1 | Status: SHIPPED | OUTPATIENT
Start: 2025-02-11 | End: 2025-08-10

## 2025-02-17 ENCOUNTER — APPOINTMENT (OUTPATIENT)
Dept: PRIMARY CARE | Facility: CLINIC | Age: 45
End: 2025-02-17
Payer: COMMERCIAL

## 2025-02-24 ENCOUNTER — APPOINTMENT (OUTPATIENT)
Dept: PRIMARY CARE | Facility: CLINIC | Age: 45
End: 2025-02-24
Payer: COMMERCIAL

## 2025-02-24 VITALS
BODY MASS INDEX: 27.02 KG/M2 | TEMPERATURE: 97.7 F | HEIGHT: 71 IN | WEIGHT: 193 LBS | DIASTOLIC BLOOD PRESSURE: 80 MMHG | HEART RATE: 81 BPM | SYSTOLIC BLOOD PRESSURE: 124 MMHG | OXYGEN SATURATION: 97 %

## 2025-02-24 DIAGNOSIS — I10 PRIMARY HYPERTENSION: ICD-10-CM

## 2025-02-24 DIAGNOSIS — R73.9 BLOOD GLUCOSE ELEVATED: ICD-10-CM

## 2025-02-24 DIAGNOSIS — Z00.00 ENCOUNTER FOR ROUTINE HISTORY AND PHYSICAL EXAM FOR MALE: Primary | ICD-10-CM

## 2025-02-24 DIAGNOSIS — E78.5 HYPERLIPIDEMIA, UNSPECIFIED HYPERLIPIDEMIA TYPE: ICD-10-CM

## 2025-02-24 PROCEDURE — 3074F SYST BP LT 130 MM HG: CPT | Performed by: FAMILY MEDICINE

## 2025-02-24 PROCEDURE — 99396 PREV VISIT EST AGE 40-64: CPT | Performed by: FAMILY MEDICINE

## 2025-02-24 PROCEDURE — 1036F TOBACCO NON-USER: CPT | Performed by: FAMILY MEDICINE

## 2025-02-24 PROCEDURE — 3079F DIAST BP 80-89 MM HG: CPT | Performed by: FAMILY MEDICINE

## 2025-02-24 PROCEDURE — 3008F BODY MASS INDEX DOCD: CPT | Performed by: FAMILY MEDICINE

## 2025-02-24 ASSESSMENT — ENCOUNTER SYMPTOMS
POLYDIPSIA: 0
ARTHRALGIAS: 0
BLOOD IN STOOL: 0
TREMORS: 0
STRIDOR: 0
FLANK PAIN: 0
DEPRESSION: 0
DECREASED CONCENTRATION: 0
HEMATURIA: 0
CARDIOVASCULAR NEGATIVE: 1
SINUS PRESSURE: 0
MYALGIAS: 0
APPETITE CHANGE: 0
WOUND: 0
BACK PAIN: 0
BRUISES/BLEEDS EASILY: 0
ACTIVITY CHANGE: 0
SINUS PAIN: 0
VOMITING: 0
COUGH: 0
EYE REDNESS: 0
UNEXPECTED WEIGHT CHANGE: 0
FACIAL ASYMMETRY: 0
ABDOMINAL PAIN: 0
FREQUENCY: 0
NUMBNESS: 0
EYE ITCHING: 0
LIGHT-HEADEDNESS: 0
ADENOPATHY: 0
DIFFICULTY URINATING: 0
CHILLS: 0
NAUSEA: 0
AGITATION: 0
DIARRHEA: 0
GASTROINTESTINAL NEGATIVE: 1
VOICE CHANGE: 0
PALPITATIONS: 0
SLEEP DISTURBANCE: 0
CHEST TIGHTNESS: 0
SPEECH DIFFICULTY: 0
NERVOUS/ANXIOUS: 0
DIZZINESS: 0
SORE THROAT: 0
LOSS OF SENSATION IN FEET: 0
DYSURIA: 0
EYE PAIN: 0
DIAPHORESIS: 0
SEIZURES: 0
TROUBLE SWALLOWING: 0
ABDOMINAL DISTENTION: 0
OCCASIONAL FEELINGS OF UNSTEADINESS: 0
CONSTIPATION: 0
MUSCULOSKELETAL NEGATIVE: 1
FATIGUE: 0
EYE DISCHARGE: 0
ANAL BLEEDING: 0
HEADACHES: 0

## 2025-02-24 ASSESSMENT — COLUMBIA-SUICIDE SEVERITY RATING SCALE - C-SSRS
6. HAVE YOU EVER DONE ANYTHING, STARTED TO DO ANYTHING, OR PREPARED TO DO ANYTHING TO END YOUR LIFE?: NO
2. HAVE YOU ACTUALLY HAD ANY THOUGHTS OF KILLING YOURSELF?: NO
1. IN THE PAST MONTH, HAVE YOU WISHED YOU WERE DEAD OR WISHED YOU COULD GO TO SLEEP AND NOT WAKE UP?: NO

## 2025-02-24 ASSESSMENT — PATIENT HEALTH QUESTIONNAIRE - PHQ9
1. LITTLE INTEREST OR PLEASURE IN DOING THINGS: NOT AT ALL
2. FEELING DOWN, DEPRESSED OR HOPELESS: NOT AT ALL
SUM OF ALL RESPONSES TO PHQ9 QUESTIONS 1 AND 2: 0
10. IF YOU CHECKED OFF ANY PROBLEMS, HOW DIFFICULT HAVE THESE PROBLEMS MADE IT FOR YOU TO DO YOUR WORK, TAKE CARE OF THINGS AT HOME, OR GET ALONG WITH OTHER PEOPLE: NOT DIFFICULT AT ALL

## 2025-02-24 NOTE — PROGRESS NOTES
Subjective   Patient ID: Lance Cordero is a 44 y.o. male who presents for Annual Exam.    Patient presents for patient had no troubles with headache he is lost 20 pounds exercising regularly he is cut the junk food out of he is drinking no alcohol.         Alcohol intake: none  Caffeine intake: 1 cup a day  Exercise: lifting indoor track    Last Colonoscopy: N/A  Last Pap smear: N/A  Mammogram:N/A  Last Dexa scan:N/A    Shingles vaccine: n/A  TdaP vaccine:     Review of Systems   Constitutional:  Negative for activity change, appetite change, chills, diaphoresis, fatigue and unexpected weight change.   HENT: Negative.  Negative for congestion, dental problem, ear discharge, ear pain, hearing loss, mouth sores, nosebleeds, postnasal drip, sinus pressure, sinus pain, sore throat, tinnitus, trouble swallowing and voice change.    Eyes:  Negative for pain, discharge, redness, itching and visual disturbance.   Respiratory:  Negative for cough, chest tightness and stridor.    Cardiovascular: Negative.  Negative for chest pain, palpitations and leg swelling.   Gastrointestinal: Negative.  Negative for abdominal distention, abdominal pain, anal bleeding, blood in stool, constipation, diarrhea, nausea and vomiting.   Endocrine: Negative for cold intolerance, heat intolerance, polydipsia and polyuria.   Genitourinary:  Negative for difficulty urinating, dysuria, enuresis, flank pain, frequency, hematuria and testicular pain.   Musculoskeletal: Negative.  Negative for arthralgias, back pain and myalgias.   Skin: Negative.  Negative for rash and wound.   Allergic/Immunologic: Negative for environmental allergies, food allergies and immunocompromised state.   Neurological:  Negative for dizziness, tremors, seizures, facial asymmetry, speech difficulty, light-headedness, numbness and headaches.   Hematological:  Negative for adenopathy. Does not bruise/bleed easily.   Psychiatric/Behavioral:  Negative for agitation, behavioral  "problems, decreased concentration, sleep disturbance and suicidal ideas. The patient is not nervous/anxious.        Objective   /80   Pulse 81   Temp 36.5 °C (97.7 °F)   Ht 1.791 m (5' 10.5\")   Wt 87.5 kg (193 lb)   SpO2 97%   BMI 27.30 kg/m²   BSA Body surface area is 2.09 meters squared.      Physical Exam  Constitutional:       General: He is not in acute distress.     Appearance: Normal appearance. He is not ill-appearing or toxic-appearing.   HENT:      Head: Normocephalic.      Right Ear: Tympanic membrane normal.      Left Ear: Tympanic membrane normal.      Nose: Nose normal.   Eyes:      Extraocular Movements: Extraocular movements intact.      Conjunctiva/sclera: Conjunctivae normal.      Pupils: Pupils are equal, round, and reactive to light.   Cardiovascular:      Rate and Rhythm: Normal rate and regular rhythm.      Pulses: Normal pulses.      Heart sounds: Normal heart sounds.   Pulmonary:      Effort: Pulmonary effort is normal.      Breath sounds: Normal breath sounds. No wheezing or rhonchi.   Abdominal:      General: Abdomen is flat. Bowel sounds are normal. There is no distension.      Palpations: Abdomen is soft.      Tenderness: There is no abdominal tenderness. There is no right CVA tenderness or rebound.      Hernia: No hernia is present.   Genitourinary:     Penis: Normal.       Testes: Normal.   Musculoskeletal:         General: Normal range of motion.      Cervical back: Normal range of motion.      Right lower leg: No edema.   Skin:     General: Skin is warm and dry.      Capillary Refill: Capillary refill takes less than 2 seconds.      Findings: No bruising.   Neurological:      General: No focal deficit present.      Mental Status: He is alert and oriented to person, place, and time.      Cranial Nerves: No cranial nerve deficit.      Sensory: No sensory deficit.      Motor: No weakness.      Coordination: Coordination normal.      Gait: Gait normal.      Deep Tendon " Reflexes: Reflexes normal.   Psychiatric:         Mood and Affect: Mood normal.         Behavior: Behavior normal.       Lab on 11/05/2024   Component Date Value Ref Range Status    Glucose 11/05/2024 115 (H)  74 - 99 mg/dL Final    Sodium 11/05/2024 138  136 - 145 mmol/L Final    Potassium 11/05/2024 4.6  3.5 - 5.3 mmol/L Final    Chloride 11/05/2024 102  98 - 107 mmol/L Final    Bicarbonate 11/05/2024 29  21 - 32 mmol/L Final    Anion Gap 11/05/2024 12  10 - 20 mmol/L Final    Urea Nitrogen 11/05/2024 12  6 - 23 mg/dL Final    Creatinine 11/05/2024 1.03  0.50 - 1.30 mg/dL Final    eGFR 11/05/2024 >90  >60 mL/min/1.73m*2 Final    Calculations of estimated GFR are performed using the 2021 CKD-EPI Study Refit equation without the race variable for the IDMS-Traceable creatinine methods.  https://jasn.asnjournals.org/content/early/2021/09/22/ASN.8898637792    Calcium 11/05/2024 9.8  8.6 - 10.6 mg/dL Final    Albumin 11/05/2024 4.7  3.4 - 5.0 g/dL Final    Alkaline Phosphatase 11/05/2024 79  33 - 120 U/L Final    Total Protein 11/05/2024 7.6  6.4 - 8.2 g/dL Final    AST 11/05/2024 27  9 - 39 U/L Final    Bilirubin, Total 11/05/2024 0.5  0.0 - 1.2 mg/dL Final    ALT 11/05/2024 36  10 - 52 U/L Final    Patients treated with Sulfasalazine may generate falsely decreased results for ALT.    Cholesterol 11/05/2024 217 (H)  0 - 199 mg/dL Final          Age      Desirable   Borderline High   High     0-19 Y     0 - 169       170 - 199     >/= 200    20-24 Y     0 - 189       190 - 224     >/= 225         >24 Y     0 - 199       200 - 239     >/= 240   **All ranges are based on fasting samples. Specific   therapeutic targets will vary based on patient-specific   cardiac risk.    Pediatric guidelines reference:Pediatrics 2011, 128(S5).Adult guidelines reference: NCEP ATPIII Guidelines,CHELSIE 2001, 258:2486-97    Venipuncture immediately after or during the administration of Metamizole may lead to falsely low results. Testing should  be performed immediately prior to Metamizole dosing.    HDL-Cholesterol 11/05/2024 50.6  mg/dL Final      Age       Very Low   Low     Normal    High    0-19 Y    < 35      < 40     40-45     ----  20-24 Y    ----     < 40      >45      ----        >24 Y      ----     < 40     40-60      >60      Cholesterol/HDL Ratio 11/05/2024 4.3   Final      Ref Values  Desirable  < 3.4  High Risk  > 5.0    LDL Calculated 11/05/2024 143 (H)  <=99 mg/dL Final                                Near   Borderline      AGE      Desirable  Optimal    High     High     Very High     0-19 Y     0 - 109     ---    110-129   >/= 130     ----    20-24 Y     0 - 119     ---    120-159   >/= 160     ----      >24 Y     0 -  99   100-129  130-159   160-189     >/=190      VLDL 11/05/2024 24  0 - 40 mg/dL Final    Triglycerides 11/05/2024 119  0 - 149 mg/dL Final    Age              Desirable        Borderline         High        Very High  SEX:B           mg/dL             mg/dL               mg/dL      mg/dL  <=14D                       ----               ----        ----  15D-365D                    ----               ----        ----  1Y-9Y           0-74               75-99             >=100       ----  10Y-19Y        0-89                            >=130       ----  20Y-24Y        0-114             115-149             >=150      ----  >= 25Y         0-149             150-199             200-499    >=500      Venipuncture immediately after or during the administration of Metamizole may lead to falsely low results. Testing should be performed immediately prior to Metamizole dosing.    Non HDL Cholesterol 11/05/2024 166 (H)  0 - 149 mg/dL Final          Age       Desirable   Borderline High   High     Very High     0-19 Y     0 - 119       120 - 144     >/= 145    >/= 160    20-24 Y     0 - 149       150 - 189     >/= 190      ----         >24 Y    30 mg/dL above LDL Cholesterol goal      Hemoglobin A1C 11/05/2024 5.7 (H)   See comment % Final    Estimated Average Glucose 11/05/2024 117  Not Established mg/dL Final   Lab on 07/18/2024   Component Date Value Ref Range Status    WBC 07/18/2024 5.8  4.4 - 11.3 x10*3/uL Final    nRBC 07/18/2024 0.0  0.0 - 0.0 /100 WBCs Final    RBC 07/18/2024 5.16  4.50 - 5.90 x10*6/uL Final    Hemoglobin 07/18/2024 14.9  13.5 - 17.5 g/dL Final    Hematocrit 07/18/2024 46.6  41.0 - 52.0 % Final    MCV 07/18/2024 90  80 - 100 fL Final    MCH 07/18/2024 28.9  26.0 - 34.0 pg Final    MCHC 07/18/2024 32.0  32.0 - 36.0 g/dL Final    RDW 07/18/2024 12.4  11.5 - 14.5 % Final    Platelets 07/18/2024 266  150 - 450 x10*3/uL Final    Immature Granulocytes %, Automated 07/18/2024 0.2  0.0 - 0.9 % Final    Immature Granulocyte Count (IG) includes promyelocytes, myelocytes and metamyelocytes but does not include bands. Percent differential counts (%) should be interpreted in the context of the absolute cell counts (cells/UL).    Immature Granulocytes Absolute, Au* 07/18/2024 0.01  0.00 - 0.70 x10*3/uL Final    Glucose 07/18/2024 125 (H)  74 - 99 mg/dL Final    Sodium 07/18/2024 139  136 - 145 mmol/L Final    Potassium 07/18/2024 4.4  3.5 - 5.3 mmol/L Final    Chloride 07/18/2024 104  98 - 107 mmol/L Final    Bicarbonate 07/18/2024 28  21 - 32 mmol/L Final    Anion Gap 07/18/2024 11  10 - 20 mmol/L Final    Urea Nitrogen 07/18/2024 10  6 - 23 mg/dL Final    Creatinine 07/18/2024 0.97  0.50 - 1.30 mg/dL Final    eGFR 07/18/2024 >90  >60 mL/min/1.73m*2 Final    Calculations of estimated GFR are performed using the 2021 CKD-EPI Study Refit equation without the race variable for the IDMS-Traceable creatinine methods.  https://jasn.asnjournals.org/content/early/2021/09/22/ASN.0613773236    Calcium 07/18/2024 9.9  8.6 - 10.6 mg/dL Final    Albumin 07/18/2024 4.6  3.4 - 5.0 g/dL Final    Alkaline Phosphatase 07/18/2024 75  33 - 120 U/L Final    Total Protein 07/18/2024 7.6  6.4 - 8.2 g/dL Final    AST 07/18/2024 23  9 - 39 U/L  Final    Bilirubin, Total 07/18/2024 0.4  0.0 - 1.2 mg/dL Final    ALT 07/18/2024 26  10 - 52 U/L Final    Patients treated with Sulfasalazine may generate falsely decreased results for ALT.    Cholesterol 07/18/2024 203 (H)  0 - 199 mg/dL Final          Age      Desirable   Borderline High   High     0-19 Y     0 - 169       170 - 199     >/= 200    20-24 Y     0 - 189       190 - 224     >/= 225         >24 Y     0 - 199       200 - 239     >/= 240   **All ranges are based on fasting samples. Specific   therapeutic targets will vary based on patient-specific   cardiac risk.    Pediatric guidelines reference:Pediatrics 2011, 128(S5).Adult guidelines reference: NCEP ATPIII Guidelines,CHELSIE 2001, 258:2486-97    Venipuncture immediately after or during the administration of Metamizole may lead to falsely low results. Testing should be performed immediately prior to Metamizole dosing.    HDL-Cholesterol 07/18/2024 46.0  mg/dL Final      Age       Very Low   Low     Normal    High    0-19 Y    < 35      < 40     40-45     ----  20-24 Y    ----     < 40      >45      ----        >24 Y      ----     < 40     40-60      >60      Cholesterol/HDL Ratio 07/18/2024 4.4   Final      Ref Values  Desirable  < 3.4  High Risk  > 5.0    LDL Calculated 07/18/2024 129 (H)  <=99 mg/dL Final                                Near   Borderline      AGE      Desirable  Optimal    High     High     Very High     0-19 Y     0 - 109     ---    110-129   >/= 130     ----    20-24 Y     0 - 119     ---    120-159   >/= 160     ----      >24 Y     0 -  99   100-129  130-159   160-189     >/=190      VLDL 07/18/2024 28  0 - 40 mg/dL Final    Triglycerides 07/18/2024 140  0 - 149 mg/dL Final       Age         Desirable   Borderline High   High     Very High   0 D-90 D    19 - 174         ----         ----        ----  91 D- 9 Y     0 -  74        75 -  99     >/= 100      ----    10-19 Y     0 -  89        90 - 129     >/= 130      ----    20-24 Y      0 - 114       115 - 149     >/= 150      ----         >24 Y     0 - 149       150 - 199    200- 499    >/= 500    Venipuncture immediately after or during the administration of Metamizole may lead to falsely low results. Testing should be performed immediately prior to Metamizole dosing.    Non HDL Cholesterol 07/18/2024 157 (H)  0 - 149 mg/dL Final          Age       Desirable   Borderline High   High     Very High     0-19 Y     0 - 119       120 - 144     >/= 145    >/= 160    20-24 Y     0 - 149       150 - 189     >/= 190      ----         >24 Y    30 mg/dL above LDL Cholesterol goal      Thyroid Stimulating Hormone 07/18/2024 1.28  0.44 - 3.98 mIU/L Final    Neutrophils %, Manual 07/18/2024 41.0  40.0 - 80.0 % Final    Percent differential counts (%) should be interpreted in the context of the absolute cell counts (cells/uL).    Lymphocytes %, Manual 07/18/2024 33.3  13.0 - 44.0 % Final    Monocytes %, Manual 07/18/2024 12.8  2.0 - 10.0 % Final    Eosinophils %, Manual 07/18/2024 7.7  0.0 - 6.0 % Final    Basophils %, Manual 07/18/2024 2.6  0.0 - 2.0 % Final    Atypical Lymphocytes %, Manual 07/18/2024 1.7  0.0 - 2.0 % Final    Plasma Cells %, Manual 07/18/2024 0.9  0.00 - 0.00 % Final    Seg Neutrophils Absolute, Manual 07/18/2024 2.38  1.20 - 7.00 x10*3/uL Final    Lymphocytes Absolute, Manual 07/18/2024 1.93  1.20 - 4.80 x10*3/uL Final    Monocytes Absolute, Manual 07/18/2024 0.74  0.10 - 1.00 x10*3/uL Final    Eosinophils Absolute, Manual 07/18/2024 0.45  0.00 - 0.70 x10*3/uL Final    Basophils Absolute, Manual 07/18/2024 0.15 (H)  0.00 - 0.10 x10*3/uL Final    Atypical Lymphs Absolute, Manual 07/18/2024 0.10  0.00 - 0.50 x10*3/uL Final    Plasma Cells Absolute, Manual 07/18/2024 0.05  0.00 - 0.00 x10*3/uL Final    Total Cells Counted 07/18/2024 117   Final    RBC Morphology 07/18/2024 No significant RBC morphology present   Final     Current Outpatient Medications on File Prior to Visit   Medication Sig  Dispense Refill    amLODIPine (Norvasc) 2.5 mg tablet Take 1 tablet (2.5 mg) by mouth once daily. 90 tablet 1    hydrOXYzine HCL (Atarax) 25 mg tablet Take 1 tablet (25 mg) by mouth as needed at bedtime (insomnia). 90 tablet 1    pimecrolimus (Elidel) 1 % cream 1 APPLICATION TO AFFECTED AREA EXTERNALLY TWICE A DAY TO FACIAL RASH, MAY USE DAILY IF NEEDED      tacrolimus (Protopic) 0.1 % ointment 1 APPLICATION EXTERNALLY ONCE A DAY TO ACTIVE AREAS OF RASH 30 DAYS       No current facility-administered medications on file prior to visit.     No images are attached to the encounter.            Assessment/Plan   Problem List Items Addressed This Visit             ICD-10-CM    Primary hypertension I10     Blood pressure well-controlled         Blood glucose elevated R73.9     Improved then recheck labs in 3 months         Hyperlipemia E78.5     Following up with lab in 3 months         Encounter for routine history and physical exam for male - Primary Z00.00

## 2025-02-24 NOTE — PATIENT INSTRUCTIONS
Congratulations on weight loss    Please continue exercise regularly watch diet closely.    Going as we recheck the labs in 3 months with the lifestyle changes to see where we are.

## 2025-05-01 DIAGNOSIS — E78.5 HYPERLIPIDEMIA, UNSPECIFIED HYPERLIPIDEMIA TYPE: ICD-10-CM

## 2025-05-01 DIAGNOSIS — I10 PRIMARY HYPERTENSION: ICD-10-CM

## 2025-05-01 DIAGNOSIS — R73.9 BLOOD GLUCOSE ELEVATED: ICD-10-CM

## 2025-05-27 DIAGNOSIS — I10 PRIMARY HYPERTENSION: ICD-10-CM

## 2025-05-27 RX ORDER — AMLODIPINE BESYLATE 2.5 MG/1
2.5 TABLET ORAL DAILY
Qty: 30 TABLET | Refills: 5 | Status: SHIPPED | OUTPATIENT
Start: 2025-05-27